# Patient Record
Sex: FEMALE | Race: WHITE | NOT HISPANIC OR LATINO | Employment: FULL TIME | URBAN - METROPOLITAN AREA
[De-identification: names, ages, dates, MRNs, and addresses within clinical notes are randomized per-mention and may not be internally consistent; named-entity substitution may affect disease eponyms.]

---

## 2020-11-10 ENCOUNTER — OFFICE VISIT (OUTPATIENT)
Dept: OBGYN CLINIC | Facility: CLINIC | Age: 40
End: 2020-11-10
Payer: COMMERCIAL

## 2020-11-10 VITALS
HEIGHT: 66 IN | BODY MASS INDEX: 25.49 KG/M2 | SYSTOLIC BLOOD PRESSURE: 136 MMHG | WEIGHT: 158.6 LBS | DIASTOLIC BLOOD PRESSURE: 74 MMHG | TEMPERATURE: 99.6 F

## 2020-11-10 DIAGNOSIS — Z01.419 WELL WOMAN EXAM WITH ROUTINE GYNECOLOGICAL EXAM: Primary | ICD-10-CM

## 2020-11-10 DIAGNOSIS — Z12.31 ENCOUNTER FOR SCREENING MAMMOGRAM FOR MALIGNANT NEOPLASM OF BREAST: ICD-10-CM

## 2020-11-10 PROCEDURE — 87624 HPV HI-RISK TYP POOLED RSLT: CPT | Performed by: NURSE PRACTITIONER

## 2020-11-10 PROCEDURE — G0145 SCR C/V CYTO,THINLAYER,RESCR: HCPCS | Performed by: NURSE PRACTITIONER

## 2020-11-10 PROCEDURE — S0610 ANNUAL GYNECOLOGICAL EXAMINA: HCPCS | Performed by: NURSE PRACTITIONER

## 2020-11-10 RX ORDER — CETIRIZINE HYDROCHLORIDE 10 MG/1
10 TABLET ORAL DAILY
COMMUNITY

## 2020-11-12 LAB
HPV HR 12 DNA CVX QL NAA+PROBE: NEGATIVE
HPV16 DNA CVX QL NAA+PROBE: NEGATIVE
HPV18 DNA CVX QL NAA+PROBE: NEGATIVE

## 2020-11-13 LAB
LAB AP GYN PRIMARY INTERPRETATION: NORMAL
Lab: NORMAL

## 2020-11-16 ENCOUNTER — TELEPHONE (OUTPATIENT)
Dept: OBGYN CLINIC | Facility: CLINIC | Age: 40
End: 2020-11-16

## 2021-03-31 DIAGNOSIS — Z23 ENCOUNTER FOR IMMUNIZATION: ICD-10-CM

## 2022-02-03 ENCOUNTER — HOSPITAL ENCOUNTER (OUTPATIENT)
Dept: RADIOLOGY | Age: 42
Discharge: HOME | End: 2022-02-03
Payer: COMMERCIAL

## 2022-02-03 VITALS — BODY MASS INDEX: 24.11 KG/M2 | WEIGHT: 150 LBS | HEIGHT: 66 IN

## 2022-02-03 DIAGNOSIS — Z12.31 ENCOUNTER FOR SCREENING MAMMOGRAM FOR MALIGNANT NEOPLASM OF BREAST: ICD-10-CM

## 2022-02-03 PROCEDURE — 77067 SCR MAMMO BI INCL CAD: CPT

## 2022-02-03 PROCEDURE — 77063 BREAST TOMOSYNTHESIS BI: CPT

## 2022-02-25 ENCOUNTER — HOSPITAL ENCOUNTER (OUTPATIENT)
Dept: MAMMOGRAPHY | Facility: CLINIC | Age: 42
Discharge: HOME | End: 2022-02-25
Payer: COMMERCIAL

## 2022-02-25 ENCOUNTER — HOSPITAL ENCOUNTER (OUTPATIENT)
Dept: ULTRASOUND IMAGING | Facility: CLINIC | Age: 42
Discharge: HOME | End: 2022-02-25
Payer: COMMERCIAL

## 2022-02-25 VITALS — HEIGHT: 66 IN | BODY MASS INDEX: 24.11 KG/M2 | WEIGHT: 150 LBS

## 2022-02-25 DIAGNOSIS — R92.8 ABNORMAL SCREENING MAMMOGRAM: ICD-10-CM

## 2022-02-25 PROCEDURE — 76642 ULTRASOUND BREAST LIMITED: CPT

## 2022-02-25 PROCEDURE — G0279 TOMOSYNTHESIS, MAMMO: HCPCS

## 2022-02-25 PROCEDURE — 77065 DX MAMMO INCL CAD UNI: CPT

## 2024-03-08 ENCOUNTER — OFFICE VISIT (OUTPATIENT)
Dept: OBGYN CLINIC | Facility: CLINIC | Age: 44
End: 2024-03-08
Payer: COMMERCIAL

## 2024-03-08 VITALS
BODY MASS INDEX: 28.45 KG/M2 | SYSTOLIC BLOOD PRESSURE: 122 MMHG | WEIGHT: 177 LBS | HEIGHT: 66 IN | DIASTOLIC BLOOD PRESSURE: 78 MMHG

## 2024-03-08 DIAGNOSIS — Z01.419 ENCOUNTER FOR GYNECOLOGICAL EXAMINATION WITHOUT ABNORMAL FINDING: Primary | ICD-10-CM

## 2024-03-08 DIAGNOSIS — Z12.31 ENCOUNTER FOR SCREENING MAMMOGRAM FOR BREAST CANCER: ICD-10-CM

## 2024-03-08 PROCEDURE — G0476 HPV COMBO ASSAY CA SCREEN: HCPCS | Performed by: PHYSICIAN ASSISTANT

## 2024-03-08 PROCEDURE — S0610 ANNUAL GYNECOLOGICAL EXAMINA: HCPCS | Performed by: PHYSICIAN ASSISTANT

## 2024-03-08 PROCEDURE — G0145 SCR C/V CYTO,THINLAYER,RESCR: HCPCS | Performed by: PHYSICIAN ASSISTANT

## 2024-03-08 NOTE — PROGRESS NOTES
Assessment/Plan:    No problem-specific Assessment & Plan notes found for this encounter.       Diagnoses and all orders for this visit:    Encounter for gynecological examination without abnormal finding  -     Liquid-based pap, screening    Encounter for screening mammogram for breast cancer  -     Mammo screening bilateral w 3d & cad; Future        Pap done.  Order for mammogram entered.  Patient to call if periods worsen or change.  If no problems, patient to return in 1 year for routine gyn care.    Subjective:      Patient ID: Amy Paris is a 43 y.o. female.    Patient is here for yearly gyn exam.  She is new to our office today.  Last yearly gyn exam in 2020.  States she is doing well overall.  Periods are regular every month, and bleeding lasts for 4-5 days.  She denies heavy bleeding, severe cramping, HA, and mood symptoms.  Patient is sexually active with her ; he had a vasectomy for contraception.  Complains of lack of libido and weight gain.  Denies bowel/bladder changes, pelvic pain, bloating, abdominal pain, n/v, change in appetite, and thyroid disease.  Had a LEEP in 2003.    Patient overdue for mammogram.  Imaging in 2020 showed a fibroadenoma and a cyst in L breast.  Patient states she was charged $500 for diagnostic imaging at the time.  Declines diagnostic mammogram order; requests screening.  Patient denies new masses, skin changes, nipple discharge, and pain/tenderness.      The following portions of the patient's history were reviewed and updated as appropriate: allergies, current medications, past family history, past medical history, past social history, past surgical history, and problem list.    Review of Systems   Constitutional:  Negative for appetite change and unexpected weight change.   Cardiovascular:         No masses, skin changes, nipple discharge, and pain/tenderness.   Gastrointestinal:  Negative for abdominal distention, abdominal pain, constipation, diarrhea, nausea  "and vomiting.   Genitourinary:  Negative for difficulty urinating, dysuria, frequency, genital sores, hematuria, menstrual problem, pelvic pain, urgency, vaginal bleeding, vaginal discharge and vaginal pain.         Objective:      /78 (BP Location: Left arm, Patient Position: Sitting, Cuff Size: Adult)   Ht 5' 6\" (1.676 m)   Wt 80.3 kg (177 lb)   LMP 02/25/2024   BMI 28.57 kg/m²          Physical Exam  Vitals reviewed. Exam conducted with a chaperone present.   Constitutional:       Appearance: Normal appearance. She is well-developed.   Neck:      Thyroid: No thyromegaly.   Pulmonary:      Effort: Pulmonary effort is normal.   Chest:   Breasts:     Breasts are symmetrical.      Right: Normal. No swelling, bleeding, inverted nipple, mass, nipple discharge, skin change or tenderness.      Left: Normal. No swelling, bleeding, inverted nipple, mass, nipple discharge, skin change or tenderness.   Abdominal:      General: There is no distension.      Palpations: Abdomen is soft.      Tenderness: There is no abdominal tenderness.   Genitourinary:     General: Normal vulva.      Pubic Area: No rash.       Labia:         Right: No rash, tenderness, lesion or injury.         Left: No rash, tenderness, lesion or injury.       Vagina: Normal. No vaginal discharge, erythema, tenderness or bleeding.      Cervix: Normal.      Uterus: Normal.       Adnexa: Right adnexa normal and left adnexa normal.        Right: No mass, tenderness or fullness.          Left: No mass, tenderness or fullness.     Musculoskeletal:      Cervical back: Neck supple.   Lymphadenopathy:      Cervical: No cervical adenopathy.      Upper Body:      Right upper body: No supraclavicular or axillary adenopathy.      Left upper body: No supraclavicular or axillary adenopathy.      Lower Body: No right inguinal adenopathy. No left inguinal adenopathy.   Skin:     General: Skin is warm and dry.   Neurological:      Mental Status: She is alert and " oriented to person, place, and time.   Psychiatric:         Behavior: Behavior normal. Behavior is cooperative.         Thought Content: Thought content normal.         Judgment: Judgment normal.

## 2024-03-15 LAB
LAB AP GYN PRIMARY INTERPRETATION: NORMAL
Lab: NORMAL

## 2024-04-22 ENCOUNTER — TELEPHONE (OUTPATIENT)
Dept: MAMMOGRAPHY | Facility: CLINIC | Age: 44
End: 2024-04-22

## 2024-06-11 ENCOUNTER — HOSPITAL ENCOUNTER (OUTPATIENT)
Dept: MAMMOGRAPHY | Facility: CLINIC | Age: 44
Discharge: HOME | End: 2024-06-11
Payer: COMMERCIAL

## 2024-06-11 ENCOUNTER — HOSPITAL ENCOUNTER (OUTPATIENT)
Dept: ULTRASOUND IMAGING | Facility: CLINIC | Age: 44
Discharge: HOME | End: 2024-06-11
Payer: COMMERCIAL

## 2024-06-11 VITALS — HEIGHT: 66 IN | WEIGHT: 175 LBS | BODY MASS INDEX: 28.12 KG/M2

## 2024-06-11 DIAGNOSIS — R92.8 ABNORMAL MAMMOGRAM: ICD-10-CM

## 2024-06-11 PROCEDURE — G0279 TOMOSYNTHESIS, MAMMO: HCPCS

## 2024-06-11 PROCEDURE — 76642 ULTRASOUND BREAST LIMITED: CPT

## 2024-06-11 PROCEDURE — 77066 DX MAMMO INCL CAD BI: CPT

## 2024-06-20 ENCOUNTER — TELEPHONE (OUTPATIENT)
Dept: OBGYN CLINIC | Facility: CLINIC | Age: 44
End: 2024-06-20

## 2024-06-20 DIAGNOSIS — Z91.89 INCREASED RISK OF BREAST CANCER: Primary | ICD-10-CM

## 2024-06-20 NOTE — TELEPHONE ENCOUNTER
Spoke with patient regarding breast imaging results.  Mass in L breast has been stable for 2 years; can return to routine mammogram next year.  Tyrer-Cuzick lifetime breast cancer risk calculated at 30.09%.  Recommended consult with breast specialist; patient amenable.  Referral entered.  Call with further questions.

## 2024-07-03 ENCOUNTER — OFFICE VISIT (OUTPATIENT)
Dept: SURGICAL ONCOLOGY | Facility: CLINIC | Age: 44
End: 2024-07-03
Payer: COMMERCIAL

## 2024-07-03 VITALS
DIASTOLIC BLOOD PRESSURE: 94 MMHG | HEART RATE: 81 BPM | TEMPERATURE: 97.8 F | HEIGHT: 66 IN | WEIGHT: 177 LBS | RESPIRATION RATE: 16 BRPM | OXYGEN SATURATION: 94 % | BODY MASS INDEX: 28.45 KG/M2 | SYSTOLIC BLOOD PRESSURE: 124 MMHG

## 2024-07-03 DIAGNOSIS — Z91.89 AT HIGH RISK FOR BREAST CANCER: Primary | ICD-10-CM

## 2024-07-03 DIAGNOSIS — Z91.89 INCREASED RISK OF BREAST CANCER: ICD-10-CM

## 2024-07-03 DIAGNOSIS — Z80.3 FAMILY HISTORY OF BREAST CANCER IN MOTHER: ICD-10-CM

## 2024-07-03 DIAGNOSIS — R92.30 DENSE BREAST TISSUE: ICD-10-CM

## 2024-07-03 DIAGNOSIS — Z80.41 FAMILY HISTORY OF OVARIAN CANCER: ICD-10-CM

## 2024-07-03 DIAGNOSIS — Z12.39 BREAST CANCER SCREENING OTHER THAN MAMMOGRAM: ICD-10-CM

## 2024-07-03 PROCEDURE — 99244 OFF/OP CNSLTJ NEW/EST MOD 40: CPT

## 2024-07-03 NOTE — PROGRESS NOTES
Surgical Oncology Consult       [facility] [place]  [address]  [place] [address]    Amy Paris  1980  [id number]  [facility] [place]  [address]  [place] [address]    1. At high risk for breast cancer  Assessment & Plan:  Will tentatively plan to screen with MRI as well as 3D given her high lifetime risk.  Patient is also looking into genetic testing.  I will see her again in 6 months for another clinical exam.    Orders:  -     MRI breast bilateral w and wo contrast w cad; Future; Expected date: 12/01/2024  2. Increased risk of breast cancer  -     Ambulatory Referral to Surgical Oncology  3. Breast cancer screening other than mammogram  -     MRI breast bilateral w and wo contrast w cad; Future; Expected date: 12/01/2024  -     BUN; Future; Expected date: 11/15/2024  -     Creatinine, serum; Future; Expected date: 11/15/2024  4. Dense breast tissue  -     MRI breast bilateral w and wo contrast w cad; Future; Expected date: 12/01/2024  5. Family history of breast cancer in mother  6. Family history of ovarian cancer      Discussion:  This is a 42 y/o female who presents today for high risk breast discussion with a strong family history of breast and ovarian cancers.  Given her high risk and dense breast tissue, I have recommended she consider annual supplemental imaging such as MRI.  I have discussed the risk of false positives with this modality.  I have also discussed the importance of genetic testing.  She is open to this, and would prefer not to wait.  She will look into the [facility] testing program for now.  If she is negative for a gene mutation and would prefer to forego MRI, we could consider ABUS instead.  For now, I will plan to see her in 6 months following MRI, but this is subject to change pending genetics results.  All questions were  answered.      Chief Complaint   Patient presents with    office visit       Return in about 6 months (around 1/3/2025) for Office Visit, Imaging - See orders.      History of Present Illness: The patient presents to the office today for high risk breast consultation.  She reports that her mother had breast cancer at the age of 50 and subsequently passed away.  Her maternal grandmother had ovarian cancer in her early 60's.  No genetic testing has been performed on anyone in the family.  The patient denies any breast-related issues or complaints. She has been undergoing close surveillance with diagnostic imaging for the past 2 years for two presumably benign masses in the left breast. On her most recent diagnostic mammogram and US, she was recommended to return to regular screening. She has heterogeneously dense breast tissue.  She reports menarche at age 12 and had her first child at the age of 30.  I have calculated her lifetime TC breast cancer risk to be 30%.      Review of Systems   Constitutional:  Negative for activity change, appetite change, fatigue and unexpected weight change.   HENT: Negative.     Respiratory: Negative.     Cardiovascular: Negative.    Gastrointestinal: Negative.  Negative for abdominal pain, nausea and vomiting.   Musculoskeletal: Negative.    Skin: Negative.  Negative for color change and wound.   Neurological: Negative.  Negative for dizziness and headaches.   Hematological: Negative.  Negative for adenopathy.   Psychiatric/Behavioral: Negative.                 Patient Active Problem List   Diagnosis    At high risk for breast cancer    Dense breast tissue     Past Medical History:   Diagnosis Date    Abnormal Pap smear of cervix     BRCA1 negative      Past Surgical History:   Procedure Laterality Date    CERVICAL BIOPSY  W/ LOOP ELECTRODE EXCISION  2003 2003-2004    TONSILLECTOMY AND ADENOIDECTOMY  1994    WISDOM TOOTH EXTRACTION  1997     Family History   Problem Relation Age of  Onset    Breast cancer Mother         50    Prostate cancer Father 56    Other Sister         spinal menengitis     No Known Problems Daughter     Ovarian cancer Maternal Grandmother 63    Esophageal cancer Maternal Grandfather         unknown age    No Known Problems Paternal Grandmother     No Known Problems Paternal Grandfather     No Known Problems Brother     No Known Problems Son     Colon cancer Neg Hx     Endometrial cancer Neg Hx      Social History     Socioeconomic History    Marital status: /Civil Union     Spouse name: Not on file    Number of children: Not on file    Years of education: Not on file    Highest education level: Not on file   Occupational History    Not on file   Tobacco Use    Smoking status: Never    Smokeless tobacco: Never   Vaping Use    Vaping status: Never Used   Substance and Sexual Activity    Alcohol use: Yes     Comment: social    Drug use: Never    Sexual activity: Yes     Partners: Male     Birth control/protection: Male Sterilization   Other Topics Concern    Not on file   Social History Narrative    Not on file     Social Determinants of Health     Financial Resource Strain: Not on file   Food Insecurity: Not on file   Transportation Needs: Not on file   Physical Activity: Not on file   Stress: Not on file   Social Connections: Not on file   Intimate Partner Violence: Not on file   Housing Stability: Not on file       Current Outpatient Medications:     cetirizine (ZyrTEC) 10 mg tablet, Take 10 mg by mouth daily, Disp: , Rfl:     b complex vitamins tablet, Take 1 tablet by mouth daily, Disp: , Rfl:   Allergies   Allergen Reactions    Clarithromycin      Vitals:    07/03/24 1421   BP: 124/94   Pulse: 81   Resp: 16   Temp: 97.8 °F (36.6 °C)   SpO2: 94%       Physical Exam  Vitals reviewed.   Constitutional:       General: She is not in acute distress.     Appearance: Normal appearance. She is normal weight. She is not ill-appearing or toxic-appearing.   HENT:       Head: Normocephalic and atraumatic.      Nose: Nose normal.      Mouth/Throat:      Mouth: Mucous membranes are moist.   Eyes:      General: No scleral icterus.  Cardiovascular:      Rate and Rhythm: Normal rate.   Pulmonary:      Effort: Pulmonary effort is normal.   Chest:   Breasts:     Right: Normal.      Left: Normal.      Comments: Breasts are smooth and symmetric bilaterally. There are no dominant masses, nodules, skin changes or tenderness on exam. I do not appreciate any adenopathy.    Musculoskeletal:         General: No swelling or tenderness. Normal range of motion.      Cervical back: Normal range of motion and neck supple.   Lymphadenopathy:      Cervical: No cervical adenopathy.      Upper Body:      Right upper body: No supraclavicular, axillary or pectoral adenopathy.      Left upper body: No supraclavicular, axillary or pectoral adenopathy.   Skin:     General: Skin is warm and dry.      Coloration: Skin is not jaundiced.      Findings: No erythema.   Neurological:      General: No focal deficit present.      Mental Status: She is alert and oriented to person, place, and time.   Psychiatric:         Mood and Affect: Mood normal.         Behavior: Behavior normal.         Thought Content: Thought content normal.         Judgment: Judgment normal.              Imaging  Mammo diagnostic bilateral w 3d & cad, US breast left limited (diagnostic)    Result Date: 6/11/2024  Narrative: DIAGNOSIS: Abnormal mammogram TECHNIQUE: Digital diagnostic mammography was performed. Computer Aided Detection (CAD) analyzed all applicable images. Left breast ultrasound was performed. COMPARISONS: Prior breast imaging dated: 02/25/2022, 02/25/2022, and 02/03/2022 RELEVANT HISTORY: Family Breast Cancer History: History of breast cancer in Mother. Family Medical History: Family medical history includes breast cancer in mother and ovarian cancer in maternal grandmother. Personal History: Hormone history includes birth  control. No known relevant surgical history. Medical history includes BRCA 1 negative. RISK ASSESSMENT: 5 Year Tyrer-Cuzick: 2.19% 10 Year Tyrer-Cuzick: 5.23% Lifetime Tyrer-Cuzick: 30.09% TISSUE DENSITY: The breasts are heterogeneously dense, which may obscure small masses. INDICATION: Amy Paris is a 43 y.o. female presenting for abnormal mammogram.  No current breast symptoms. FINDINGS: LEFT 1) MASS [A] Mammo diagnostic bilateral w 3d & cad: There is a 13 mm x 5 mm x 12 mm low density, oval mass with circumscribed margins seen in the lower inner quadrant of the left breast at 7 o'clock in the posterior depth, 5 cm from the nipple.  This does not appear significantly changed compared to the prior mammogram.  There are no suspicious grouped microcalcifications, areas of unexplained architectural distortion or abnormal skin thickening. US breast left limited (diagnostic): There is a 12 mm x 6 mm x 11 mm oval, parallel, hypoechoic mass with circumscribed margins seen in the lower inner quadrant of the left breast at 7 o'clock in the posterior depth, 5 cm from the nipple. Compared to the previous study, there are no significant changes. Associated features include internal vascularity. 2) CYST [B] US breast left limited (diagnostic): There is a 5 mm x 3 mm x 5 mm oval complicated cyst or solid mass with circumscribed margins seen in the left breast at 2 o'clock, 4 cm from the nipple. Compared to the previous study, there are no significant changes. Right Mammo diagnostic bilateral w 3d & cad There are no suspicious masses, grouped microcalcifications or areas of unexplained architectural distortion. The skin and nipple areolar complex are unremarkable.     Impression: 1.  Left breast findings demonstrate 2 years of stability, consistent with benign findings.  No additional dedicated follow-up imaging is indicated. 2.  No mammographic evidence of malignancy in the right breast. 3.  This patient has been identified  as being at elevated risk for development of breast cancer based on the Tyrer-Cuzick model. She may benefit from supplemental screening. ASSESSMENT/BI-RADS CATEGORY: Left: 2 - Benign Right: 1 - Negative Overall: 2 - Benign RECOMMENDATION:      - Routine screening mammogram in 1 year for both breasts. Workstation ID: [id number]     I personally reviewed and interpreted the above imaging data.

## 2024-07-03 NOTE — ASSESSMENT & PLAN NOTE
Will tentatively plan to screen with MRI as well as 3D given her high lifetime risk.  Patient is also looking into genetic testing.  I will see her again in 6 months for another clinical exam.

## 2024-07-04 DIAGNOSIS — Z00.6 ENCOUNTER FOR EXAMINATION FOR NORMAL COMPARISON OR CONTROL IN CLINICAL RESEARCH PROGRAM: ICD-10-CM

## 2024-07-05 ENCOUNTER — APPOINTMENT (OUTPATIENT)
Dept: LAB | Facility: CLINIC | Age: 44
End: 2024-07-05

## 2024-07-05 DIAGNOSIS — Z00.6 ENCOUNTER FOR EXAMINATION FOR NORMAL COMPARISON OR CONTROL IN CLINICAL RESEARCH PROGRAM: ICD-10-CM

## 2024-07-05 PROCEDURE — 36415 COLL VENOUS BLD VENIPUNCTURE: CPT

## 2024-07-19 LAB
APOB+LDLR+PCSK9 GENE MUT ANL BLD/T: NOT DETECTED
BRCA1+BRCA2 DEL+DUP + FULL MUT ANL BLD/T: NOT DETECTED
MLH1+MSH2+MSH6+PMS2 GN DEL+DUP+FUL M: NOT DETECTED

## 2024-08-02 DIAGNOSIS — Z80.3 FAMILY HISTORY OF BREAST CANCER IN MOTHER: Primary | ICD-10-CM

## 2024-08-02 DIAGNOSIS — Z80.41 FAMILY HISTORY OF OVARIAN CANCER: ICD-10-CM

## 2024-12-01 ENCOUNTER — HOSPITAL ENCOUNTER (OUTPATIENT)
Dept: RADIOLOGY | Facility: HOSPITAL | Age: 44
Discharge: HOME | End: 2024-12-01
Payer: COMMERCIAL

## 2024-12-01 DIAGNOSIS — Z91.89 AT HIGH RISK FOR BREAST CANCER: ICD-10-CM

## 2024-12-01 DIAGNOSIS — R92.30 DENSE BREAST TISSUE: ICD-10-CM

## 2024-12-01 DIAGNOSIS — Z12.39 BREAST CANCER SCREENING OTHER THAN MAMMOGRAM: ICD-10-CM

## 2024-12-01 PROCEDURE — C8908 MRI W/O FOL W/CONT, BREAST,: HCPCS

## 2024-12-01 PROCEDURE — A9585 GADOBUTROL INJECTION: HCPCS | Performed by: RADIOLOGY

## 2024-12-01 PROCEDURE — 77049 MRI BREAST C-+ W/CAD BI: CPT

## 2024-12-01 RX ORDER — GADOBUTROL 604.72 MG/ML
8 INJECTION INTRAVENOUS
Status: COMPLETED | OUTPATIENT
Start: 2024-12-01 | End: 2024-12-01

## 2024-12-01 RX ADMIN — GADOBUTROL 8 ML: 604.72 INJECTION INTRAVENOUS at 12:44

## 2024-12-03 DIAGNOSIS — R92.8 ABNORMAL MRI, BREAST: Primary | ICD-10-CM

## 2024-12-10 ENCOUNTER — HOSPITAL ENCOUNTER (OUTPATIENT)
Dept: ULTRASOUND IMAGING | Facility: CLINIC | Age: 44
Discharge: HOME | End: 2024-12-10
Payer: COMMERCIAL

## 2024-12-10 ENCOUNTER — HOSPITAL ENCOUNTER (OUTPATIENT)
Dept: MAMMOGRAPHY | Facility: CLINIC | Age: 44
Discharge: HOME | End: 2024-12-10
Payer: COMMERCIAL

## 2024-12-10 DIAGNOSIS — R92.8 ABNORMAL MRI, BREAST: Primary | ICD-10-CM

## 2024-12-10 DIAGNOSIS — R92.8 ABNORMAL MRI, BREAST: ICD-10-CM

## 2024-12-10 PROCEDURE — G0279 TOMOSYNTHESIS, MAMMO: HCPCS

## 2024-12-10 PROCEDURE — 76642 ULTRASOUND BREAST LIMITED: CPT

## 2024-12-10 PROCEDURE — 77065 DX MAMMO INCL CAD UNI: CPT

## 2025-01-03 ENCOUNTER — HOSPITAL ENCOUNTER (OUTPATIENT)
Dept: RADIOLOGY | Facility: HOSPITAL | Age: 45
Discharge: HOME | End: 2025-01-03
Payer: COMMERCIAL

## 2025-01-03 VITALS — HEART RATE: 70 BPM | OXYGEN SATURATION: 99 % | DIASTOLIC BLOOD PRESSURE: 67 MMHG | SYSTOLIC BLOOD PRESSURE: 119 MMHG

## 2025-01-03 DIAGNOSIS — R92.8 ABNORMAL MRI, BREAST: ICD-10-CM

## 2025-01-03 PROCEDURE — A4648 IMPLANTABLE TISSUE MARKER: HCPCS

## 2025-01-03 PROCEDURE — 88305 TISSUE EXAM BY PATHOLOGIST: CPT | Performed by: STUDENT IN AN ORGANIZED HEALTH CARE EDUCATION/TRAINING PROGRAM

## 2025-01-03 PROCEDURE — A9585 GADOBUTROL INJECTION: HCPCS

## 2025-01-03 PROCEDURE — 19085 BX BREAST 1ST LESION MR IMAG: CPT

## 2025-01-03 RX ORDER — LIDOCAINE HYDROCHLORIDE AND EPINEPHRINE BITARTRATE 20; .01 MG/ML; MG/ML
20 INJECTION, SOLUTION SUBCUTANEOUS ONCE
Status: COMPLETED | OUTPATIENT
Start: 2025-01-03 | End: 2025-01-03

## 2025-01-03 RX ORDER — GADOBUTROL 604.72 MG/ML
8 INJECTION INTRAVENOUS
Status: COMPLETED | OUTPATIENT
Start: 2025-01-03 | End: 2025-01-03

## 2025-01-03 RX ORDER — LIDOCAINE HYDROCHLORIDE 10 MG/ML
5 INJECTION, SOLUTION EPIDURAL; INFILTRATION; INTRACAUDAL; PERINEURAL ONCE
Status: COMPLETED | OUTPATIENT
Start: 2025-01-03 | End: 2025-01-03

## 2025-01-03 RX ADMIN — LIDOCAINE HYDROCHLORIDE,EPINEPHRINE BITARTRATE 20 ML: 20; .01 INJECTION, SOLUTION INFILTRATION; PERINEURAL at 09:29

## 2025-01-03 RX ADMIN — LIDOCAINE HYDROCHLORIDE 5 ML: 10 INJECTION, SOLUTION EPIDURAL; INFILTRATION; INTRACAUDAL; PERINEURAL at 09:28

## 2025-01-03 RX ADMIN — GADOBUTROL 8 ML: 604.72 INJECTION INTRAVENOUS at 09:13

## 2025-01-03 NOTE — DISCHARGE INSTR - OTHER ORDERS
POST LARGE CORE BREAST BIOPSY PROCEDURE: PATIENT INFORMATION      Place an ice pack inside your bra over the top of the gauze dressing every hour for 20 minutes (20 minutes on, 60 minutes off). Do this until bedtime. The ice pack should be used whether pain is or is not present, as it significantly reduces the chance for bruising, bleeding, or hematoma development at home after your procedure. Please use as instructed.    Do not shower or bathe until the following morning Saturday 01/04/2025 @ 10:30am.    You may shower/bathe your breast carefully with the steri-strips in place.  Be careful not to loosen them.  The steri-strips should remain in place 3-5 days to allow adequate time for your body to heal the breast biopsy incision site. If steri-strips have not fallen off on their own by Tuesday evening 01/07/2025, it would be appropriate to remove them.    You may have mild discomfort, and you may have some bruising where the needle entered the skin. Following a breast biopsy, it can be very common to have bruising around the biopsy site & in some cases the underside of the breast due to positioning during the procedure. This should clear within 1-2 weeks time post-procedure.    If you need medicine for discomfort, take acetaminophen products such as Tylenol. You may also take Advil or Motrin products. Avoid using any aspirin based products (avoid Aleve, avoid Naproxen), as these medications can increase the risk for bleeding/ hematoma development at breast biopsy site.    Do not participate in strenuous activities such as-tennis, aerobics, skiing, weight lifting > 10 lbs, etc. for 24 hours. Refrain from swimming/soaking until biopsy incision is fully healed to reduce any risk for infection.    Wearing a bra for sleeping may be more comfortable for the first 24-48 hours.    Watch for continued bleeding, pain or fever over 101. If any of these symptoms occur, please contact our breast nurse navigator at the location  where your biopsy was performed.    During normal business hours (7:30 am-4:00 pm) please call the nurse navigator at the site where your   procedure was performed:    [facility] [place] [place]:  [phone], [phone] or [phone]  [facility]:  Aishwarya Cho Radiology RN P# [phone]         or      Yolanda SUBRAMANIAN P# [phone]              After 4 PM - please call your physician or go to the nearest Emergency Department location.          9.         The final results of your biopsy are usually available within one week.

## 2025-01-03 NOTE — PROGRESS NOTES
Patient arrived via:    __x___ ambulatory    _____ wheelchair    _____ stretcher      Breast Implants:    _______ yes ____x___ no      History of prior MRI Contrast reaction?    ______ yes  ____x____ no      Is patient on aspirin, coumadin, or other blood thinning agents?    _______ yes  ____x____ no  *If patient on Coumadin/ Warfarin Last INR collected on: __/__/__ and lab level   *Not applicable, patient is not currently on any anti-coagulant medications.

## 2025-01-03 NOTE — PROGRESS NOTES
Procedure type:    _____ Ultrasound guided   _____ Stereotactic    __x____ MRI Guided    Breast:    _____ Left Breast biopsy ___x__ Right Breast biopsy    Time-out called @ 0905 and procedure confirmed with patient, myself Yolanda SUBRAMANIAN, Sasha Cho RN, radiologist Clarissa Bryant MD, Heather Zhong RT(MR), and Elisa Chavez  RT(MR).      Location:  central middle     Needle: 9G x 14cm     # of passes: 10    Clip: stoplight    Performed by: Dr Manny COBB    Pressure held for 5 minutes by: Yolanda Izaguirre    Steri Strips:    __x___ yes _____ no    Band aid:    _____ yes __x___ no  *Gauze and tape in place above steri-strip adhesive bandages at breast biopsy incision site.    Tolerated procedure:    __x___ yes _____ no

## 2025-01-06 PROCEDURE — 88305 TISSUE EXAM BY PATHOLOGIST: CPT | Performed by: STUDENT IN AN ORGANIZED HEALTH CARE EDUCATION/TRAINING PROGRAM

## 2025-01-07 ENCOUNTER — TELEPHONE (OUTPATIENT)
Dept: SURGICAL ONCOLOGY | Facility: CLINIC | Age: 45
End: 2025-01-07

## 2025-01-07 NOTE — TELEPHONE ENCOUNTER
Left voicemail for patient that biopsy was negative for cancer, no concerning findings were seen. I will review in-detail at her appointment tomorrow.

## 2025-01-08 ENCOUNTER — OFFICE VISIT (OUTPATIENT)
Dept: SURGICAL ONCOLOGY | Facility: CLINIC | Age: 45
End: 2025-01-08
Payer: COMMERCIAL

## 2025-01-08 VITALS
BODY MASS INDEX: 28.61 KG/M2 | TEMPERATURE: 97.9 F | DIASTOLIC BLOOD PRESSURE: 80 MMHG | HEART RATE: 69 BPM | WEIGHT: 178 LBS | HEIGHT: 66 IN | SYSTOLIC BLOOD PRESSURE: 142 MMHG | OXYGEN SATURATION: 99 %

## 2025-01-08 DIAGNOSIS — Z80.3 FAMILY HISTORY OF BREAST CANCER IN MOTHER: ICD-10-CM

## 2025-01-08 DIAGNOSIS — Z12.31 VISIT FOR SCREENING MAMMOGRAM: ICD-10-CM

## 2025-01-08 DIAGNOSIS — R92.30 DENSE BREAST TISSUE: ICD-10-CM

## 2025-01-08 DIAGNOSIS — Z12.39 BREAST CANCER SCREENING OTHER THAN MAMMOGRAM: ICD-10-CM

## 2025-01-08 DIAGNOSIS — Z80.41 FAMILY HISTORY OF OVARIAN CANCER: Primary | ICD-10-CM

## 2025-01-08 DIAGNOSIS — Z91.89 AT HIGH RISK FOR BREAST CANCER: ICD-10-CM

## 2025-01-08 DIAGNOSIS — R92.8 ABNORMAL MRI, BREAST: ICD-10-CM

## 2025-01-08 PROCEDURE — 99213 OFFICE O/P EST LOW 20 MIN: CPT

## 2025-01-08 NOTE — ASSESSMENT & PLAN NOTE
Non-mass enhancement in the left breast was determined by biopsy to be PASH; no treatment necessary.  Right breast masses seen on MRI were confirmed to be cysts by US; no follow-up necessary.

## 2025-01-08 NOTE — ASSESSMENT & PLAN NOTE
Given her family history and dense breast tissue, we will continue with annual breast MRI staggered between screening mammograms.  I will see her back in 1 year for a clinical exam.  Orders:  •  MRI breast bilateral w and wo contrast w cad; Future

## 2025-01-08 NOTE — ASSESSMENT & PLAN NOTE
Patient was negative for BRCA mutation on limited [facility] panel.  I will refer her to Oncology Genetics to discuss benefits of submitting for a larger panel.   Orders:  •  MRI breast bilateral w and wo contrast w cad; Future  •  Ambulatory Referral to Oncology Genetics; Future

## 2025-01-08 NOTE — PROGRESS NOTES
Name: Amy Paris      : 1980      MRN: [id number]  Encounter Provider: YONY Fuller  Encounter Date: 2025   Encounter department: [facility] [place]  :  Assessment & Plan  Family history of ovarian cancer    Orders:  •  Ambulatory Referral to Oncology Genetics; Future    Family history of breast cancer in mother  Patient was negative for BRCA mutation on limited [employer] gene panel.  I will refer her to Oncology Genetics to discuss benefits of submitting for a larger panel.   Orders:  •  MRI breast bilateral w and wo contrast w cad; Future  •  Ambulatory Referral to Oncology Genetics; Future    At high risk for breast cancer  Given her family history and dense breast tissue, we will continue with annual breast MRI staggered between screening mammograms.  I will see her back in 1 year for a clinical exam.  Orders:  •  MRI breast bilateral w and wo contrast w cad; Future    Breast cancer screening other than mammogram    Orders:  •  MRI breast bilateral w and wo contrast w cad; Future    Dense breast tissue    Orders:  •  MRI breast bilateral w and wo contrast w cad; Future    Visit for screening mammogram    Orders:  •  Mammo screening bilateral w 3d and cad; Future    Abnormal MRI, breast  Non-mass enhancement in the left breast was determined by biopsy to be PASH; no treatment necessary.  Right breast masses seen on MRI were confirmed to be cysts by US; no follow-up necessary.             History of Present Illness   Amy Paris is a 44 y.o. year old female who presents today for dense breast high risk follow-up.  Breast MRI performed on  revealed non-mass enhancement in the central left breast, measuring up to 1.8cm. In addition, a subcentimeter mass was seen in the right breast. She then underwent diagnostic US, which showed microclustered cysts at the 12:00 position in the right breast, correlating with the MRI finding.  No correlate was seen  "in the left breast by either US or mammo, and patient was recommended to undergo MRI-guided biopsy. 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  The patient denies any breast lumps, skin changes or tenderness.         Review of Systems A complete review of systems is negative other than that noted above in the HPI.         Objective   /80   Pulse 69   Temp 97.9 °F (36.6 °C)   Ht 5' 6\" (1.676 m)   Wt 80.7 kg (178 lb)   LMP 12/10/2024   SpO2 99%   BMI 28.73 kg/m²     Pain Screening:  Pain Score: 0-No pain  ECOG    Physical Exam  Vitals reviewed.   Constitutional:       General: She is not in acute distress.     Appearance: Normal appearance. She is not ill-appearing or toxic-appearing.   HENT:      Head: Normocephalic and atraumatic.   Eyes:      General: No scleral icterus.  Cardiovascular:      Rate and Rhythm: Normal rate.   Pulmonary:      Effort: Pulmonary effort is normal.   Chest:   Breasts:     Right: Normal.      Left: Normal.      Comments: Breasts are smooth and symmetric bilaterally. There are no dominant masses, nodules, skin changes or tenderness on exam. I do not appreciate any adenopathy.  Musculoskeletal:         General: No swelling or tenderness. Normal range of motion.      Cervical back: Normal range of motion and neck supple.   Lymphadenopathy:      Cervical: No cervical adenopathy.      Upper Body:      Right upper body: No supraclavicular, axillary or pectoral adenopathy.      Left upper body: No supraclavicular, axillary or pectoral adenopathy.   Skin:     General: Skin is warm and dry.      Coloration: Skin is not jaundiced or pale.   Neurological:      General: No focal deficit present.      Mental Status: She is alert and oriented to person, place, and time.   Psychiatric:         Mood and Affect: Mood normal.         Behavior: Behavior normal.         Thought Content: " Thought content normal.         Judgment: Judgment normal.            Pathology: I have reviewed pathology reports from 1/3/2025:  A. Left Breast, Central Middle Depth, MRI-Guided Core Needle Biopsy for Non-Mass Enhancement:  - Pseudoangiomatous stromal hyperplasia.  - Ectatic ducts.  - Negative for atypical epithelial hyperplasia, in-situ carcinoma, and invasive carcinoma.       Radiology Results Review: I have reviewed radiology reports from December 2024 including: MRI, mammogram and US.

## 2025-03-10 NOTE — PROGRESS NOTES
Assessment & Plan   Diagnoses and all orders for this visit:    Women's annual routine gynecological examination    Colon cancer screening  -     Ambulatory Referral to Gastroenterology; Future        ASSESSMENT & PLAN: Amy is a 44 y.o.  with normal gynecologic exam.    1.  Routine well woman exam done today.  2.  Cervical Cancer Screening: Pap with cotesting HPV was not done today.  Current ASCCP Guidelines reviewed.   3.. Mammogram is up to date. Screening mammogram and MRI breast ordered yearly. Follows up with breast specialist.   4.  Amy declined STD testing in a mutually monogamous relationship.   5. Colon cancer screening to begin at age 45 with no other risk factors.  Referral placed for GI for colonoscopy today.   6. I have discussed the importance of monthly self-breast exams, exercise a minimum of 150 minutes a week including weight bearing exercises and maintaining a healthy diet including adequate supplementation of Calcium and Vitamin D.   7 Return to office in 12 months for annual exam.   8. Call your insurance company to verify coverage prior to completing any ordered tests.     All questions answered to the best of my ability.      Subjective     HPI   Amy Paris is a 44 y.o. female who presents for annual well woman exam.     GYN:  Menses are regular, monthly, lasting 4-6 days with heavy to light flow. Will saturate 4-6 tampons on heaviest days.. denies intermenstrual bleeding, or spotting.   denies vaginal discharge, labial erythema or lesions.   denies vaginal itching, irritation and dryness.  Patient is not sexually active with  of 16 years, acceptable in relationship.   denies gynecologic surgeries.    OB:  OB History    Para Term  AB Living   4 2 2  2 2   SAB IAB Ectopic Multiple Live Births   2    2      # Outcome Date GA Lbr Claus/2nd Weight Sex Type Anes PTL Lv   4 Term     M Vag-Spont      3 Term     F Vag-Spont      2 SAB            1 SAB                Obstetric Comments   Menarche at 12   First child at 30         :  denies dysuria, urinary frequency or urgency.  denies hematuria, flank pain, incontinence.    Breast:  denies breast mass, skin changes, dimpling, reddening, nipple retraction.  denies breast discharge.  denies breast tenderness bilaterally  Neg BRCA and [place] syndrome - she follow up with breast specialist.     Patient does have a family history of breast, endometrial, colon, or ovarian ca. Mother has history of breast cancer diagnosed at 50.     Health Maintenance:    She exercises 7 days per week. Daily walking. Weight training 2-3x a week.   She feels safe at home and denies domestic violence    She does follow a well balanced diet.    She does not use tobacco and social alcohol  She does not follow with a PCP.    Social History:  Social History     Socioeconomic History    Marital status: /Civil Union     Spouse name: Not on file    Number of children: Not on file    Years of education: Not on file    Highest education level: Not on file   Occupational History    Not on file   Tobacco Use    Smoking status: Never    Smokeless tobacco: Never   Vaping Use    Vaping status: Never Used   Substance and Sexual Activity    Alcohol use: Yes     Alcohol/week: 4.0 standard drinks of alcohol     Types: 4 Standard drinks or equivalent per week     Comment: social    Drug use: Never    Sexual activity: Yes     Partners: Male     Birth control/protection: Male Sterilization   Other Topics Concern    Not on file   Social History Narrative    Not on file     Social Drivers of Health     Financial Resource Strain: Not on file   Food Insecurity: Not on file   Transportation Needs: Not on file   Physical Activity: Not on file   Stress: Not on file   Social Connections: Not on file   Intimate Partner Violence: Not on file   Housing Stability: Not on file       Social History     Tobacco Use    Smoking status: Never    Smokeless tobacco: Never    Vaping Use    Vaping status: Never Used   Substance Use Topics    Alcohol use: Yes     Alcohol/week: 4.0 standard drinks of alcohol     Types: 4 Standard drinks or equivalent per week     Comment: social    Drug use: Never       Screening:  Cervical cancer: last pap smear in 2024. Results were NILM- HRHPV.   History of abnormal pap smears: 20+ years ago with abnormal pap with LEEP procedure in .   Breast cancer: last mammogram in 2024. Results were BIRADS 2.  Colon cancer: referrral placed for this year.  STD screening: declined .    Review of Systems   Constitutional: Negative.  Negative for activity change, appetite change and fever.   Respiratory:  Negative for shortness of breath.    Cardiovascular:  Negative for chest pain.   Gastrointestinal:  Negative for abdominal pain, constipation, diarrhea and vomiting.   Genitourinary:  Negative for dyspareunia, dysuria, frequency, menstrual problem, pelvic pain, vaginal bleeding, vaginal discharge and vaginal pain.   Skin:  Negative for color change.   Psychiatric/Behavioral: Negative.     All other systems reviewed and are negative.      The following portions of the patient's history were reviewed and updated as appropriate: allergies, current medications, past family history, past medical history, past social history, past surgical history, and problem list.         OB History          4    Para   2    Term   2            AB   2    Living   2         SAB   2    IAB        Ectopic        Multiple        Live Births   2           Obstetric Comments   Menarche at 12  First child at 30               Past Medical History:   Diagnosis Date    Abnormal Pap smear of cervix     BRCA1 negative        Past Surgical History:   Procedure Laterality Date    BREAST BIOPSY  1/3/25    CERVICAL BIOPSY  W/ LOOP ELECTRODE EXCISION  2003-2004    MRI BREAST BIOPSY LEFT (ALL INCLUSIVE) Left 2025    Benign. PASH.    TONSILLECTOMY AND  ADENOIDECTOMY  1994    WISDOM TOOTH EXTRACTION  1997       Family History   Problem Relation Age of Onset    Breast cancer Mother         50    Prostate cancer Father 56    No Known Problems Daughter     Ovarian cancer Maternal Grandmother 63    Esophageal cancer Maternal Grandfather         unknown age    No Known Problems Paternal Grandmother     No Known Problems Paternal Grandfather     No Known Problems Brother     No Known Problems Son     Colon cancer Neg Hx     Endometrial cancer Neg Hx          Current Outpatient Medications:     cetirizine (ZyrTEC) 10 mg tablet, Take 10 mg by mouth daily, Disp: , Rfl:     b complex vitamins tablet, Take 1 tablet by mouth daily, Disp: , Rfl:     Allergies   Allergen Reactions    Clarithromycin        Objective   Vitals:    03/13/25 0830   BP: 130/80   BP Location: Left arm   Patient Position: Sitting   Cuff Size: Standard   Weight: 79.8 kg (176 lb)     Physical Exam  Vitals and nursing note reviewed.   Constitutional:       General: She is not in acute distress.     Appearance: Normal appearance. She is not ill-appearing.   HENT:      Head: Normocephalic.   Neck:      Thyroid: No thyromegaly or thyroid tenderness.   Cardiovascular:      Rate and Rhythm: Normal rate and regular rhythm.      Heart sounds: Normal heart sounds.   Pulmonary:      Effort: Pulmonary effort is normal. No respiratory distress.      Breath sounds: Normal breath sounds.   Chest:   Breasts:     Right: Normal. No inverted nipple, nipple discharge, skin change or tenderness.      Left: Normal. No inverted nipple, nipple discharge, skin change or tenderness.   Abdominal:      General: Abdomen is flat.      Palpations: Abdomen is soft.      Tenderness: There is no abdominal tenderness. There is no guarding.   Genitourinary:     General: Normal vulva.      Exam position: Lithotomy position.      Labia:         Right: No rash, tenderness or lesion.         Left: No rash, tenderness or lesion.       Urethra:  No prolapse.      Vagina: Normal. No vaginal discharge, erythema, tenderness or lesions.      Cervix: Normal. No cervical motion tenderness, discharge or lesion.      Uterus: Not tender and no uterine prolapse.       Adnexa:         Right: No tenderness.          Left: No tenderness.     Musculoskeletal:      Cervical back: Neck supple.   Lymphadenopathy:      Cervical: No cervical adenopathy.   Skin:     General: Skin is warm and dry.      Capillary Refill: Capillary refill takes less than 2 seconds.   Neurological:      General: No focal deficit present.      Mental Status: She is alert and oriented to person, place, and time.   Psychiatric:         Mood and Affect: Mood normal.         Behavior: Behavior normal.         Thought Content: Thought content normal.         Elaine BAHKTA  OB/GYN  3/13/2025  9:03 AM

## 2025-03-13 ENCOUNTER — ANNUAL EXAM (OUTPATIENT)
Dept: OBGYN CLINIC | Facility: CLINIC | Age: 45
End: 2025-03-13
Payer: COMMERCIAL

## 2025-03-13 VITALS — DIASTOLIC BLOOD PRESSURE: 80 MMHG | BODY MASS INDEX: 28.41 KG/M2 | SYSTOLIC BLOOD PRESSURE: 130 MMHG | WEIGHT: 176 LBS

## 2025-03-13 DIAGNOSIS — Z01.419 WOMEN'S ANNUAL ROUTINE GYNECOLOGICAL EXAMINATION: Primary | ICD-10-CM

## 2025-03-13 DIAGNOSIS — Z12.11 COLON CANCER SCREENING: ICD-10-CM

## 2025-03-13 PROCEDURE — S0612 ANNUAL GYNECOLOGICAL EXAMINA: HCPCS

## 2025-06-12 ENCOUNTER — HOSPITAL ENCOUNTER (OUTPATIENT)
Dept: RADIOLOGY | Age: 45
Discharge: HOME | End: 2025-06-12
Payer: COMMERCIAL

## 2025-06-12 VITALS — HEIGHT: 66 IN | BODY MASS INDEX: 28.12 KG/M2 | WEIGHT: 175 LBS

## 2025-06-12 DIAGNOSIS — Z12.31 VISIT FOR SCREENING MAMMOGRAM: ICD-10-CM

## 2025-06-12 PROCEDURE — 77067 SCR MAMMO BI INCL CAD: CPT

## 2025-06-12 PROCEDURE — 77063 BREAST TOMOSYNTHESIS BI: CPT
